# Patient Record
Sex: FEMALE | Race: WHITE | HISPANIC OR LATINO | URBAN - METROPOLITAN AREA
[De-identification: names, ages, dates, MRNs, and addresses within clinical notes are randomized per-mention and may not be internally consistent; named-entity substitution may affect disease eponyms.]

---

## 2017-01-26 ENCOUNTER — EMERGENCY (EMERGENCY)
Facility: HOSPITAL | Age: 32
LOS: 0 days | Discharge: HOME | End: 2017-01-26
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
## 2017-06-27 DIAGNOSIS — S60.416A ABRASION OF RIGHT LITTLE FINGER, INITIAL ENCOUNTER: ICD-10-CM

## 2017-06-27 DIAGNOSIS — S63.616A UNSPECIFIED SPRAIN OF RIGHT LITTLE FINGER, INITIAL ENCOUNTER: ICD-10-CM
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
## 2017-06-27 DIAGNOSIS — M79.644 PAIN IN RIGHT FINGER(S): ICD-10-CM
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
## 2022-11-10 ENCOUNTER — INPATIENT (INPATIENT)
Facility: HOSPITAL | Age: 37
LOS: 2 days | Discharge: HOME | End: 2022-11-13
Attending: OBSTETRICS & GYNECOLOGY | Admitting: OBSTETRICS & GYNECOLOGY

## 2022-11-10 VITALS — HEART RATE: 78 BPM | DIASTOLIC BLOOD PRESSURE: 79 MMHG | SYSTOLIC BLOOD PRESSURE: 128 MMHG

## 2022-11-10 PROCEDURE — 86077 PHYS BLOOD BANK SERV XMATCH: CPT

## 2022-11-10 RX ORDER — CHLORHEXIDINE GLUCONATE 213 G/1000ML
1 SOLUTION TOPICAL ONCE
Refills: 0 | Status: DISCONTINUED | OUTPATIENT
Start: 2022-11-10 | End: 2022-11-11

## 2022-11-10 RX ORDER — OXYTOCIN 10 UNIT/ML
333.33 VIAL (ML) INJECTION
Qty: 20 | Refills: 0 | Status: DISCONTINUED | OUTPATIENT
Start: 2022-11-10 | End: 2022-11-11

## 2022-11-10 RX ORDER — SODIUM CHLORIDE 9 MG/ML
1000 INJECTION, SOLUTION INTRAVENOUS
Refills: 0 | Status: DISCONTINUED | OUTPATIENT
Start: 2022-11-10 | End: 2022-11-11

## 2022-11-10 NOTE — OB RN TRIAGE NOTE - FALL HARM RISK - UNIVERSAL INTERVENTIONS
Bed in lowest position, wheels locked, appropriate side rails in place/Call bell, personal items and telephone in reach/Instruct patient to call for assistance before getting out of bed or chair/Non-slip footwear when patient is out of bed/[place] to call system/Physically safe environment - no spills, clutter or unnecessary equipment/Purposeful Proactive Rounding/Room/bathroom lighting operational, light cord in reach

## 2022-11-10 NOTE — OB RN PATIENT PROFILE - FALL HARM RISK - UNIVERSAL INTERVENTIONS
Bed in lowest position, wheels locked, appropriate side rails in place/Call bell, personal items and telephone in reach/Instruct patient to call for assistance before getting out of bed or chair/Non-slip footwear when patient is out of bed/[place] to call system/Physically safe environment - no spills, clutter or unnecessary equipment/Purposeful Proactive Rounding/Room/bathroom lighting operational, light cord in reach

## 2022-11-11 DIAGNOSIS — Z90.49 ACQUIRED ABSENCE OF OTHER SPECIFIED PARTS OF DIGESTIVE TRACT: Chronic | ICD-10-CM

## 2022-11-11 DIAGNOSIS — Z98.890 OTHER SPECIFIED POSTPROCEDURAL STATES: Chronic | ICD-10-CM

## 2022-11-11 LAB
ALLERGY+IMMUNOLOGY DIAG STUDY NOTE: SIGNIFICANT CHANGE UP
AMPHET UR-MCNC: NEGATIVE — SIGNIFICANT CHANGE UP
APPEARANCE UR: CLEAR — SIGNIFICANT CHANGE UP
BACTERIA # UR AUTO: NEGATIVE — SIGNIFICANT CHANGE UP
BARBITURATES UR SCN-MCNC: NEGATIVE — SIGNIFICANT CHANGE UP
BASOPHILS # BLD AUTO: 0.05 K/UL — SIGNIFICANT CHANGE UP (ref 0–0.2)
BASOPHILS NFR BLD AUTO: 0.4 % — SIGNIFICANT CHANGE UP (ref 0–1)
BENZODIAZ UR-MCNC: NEGATIVE — SIGNIFICANT CHANGE UP
BILIRUB UR-MCNC: NEGATIVE — SIGNIFICANT CHANGE UP
BLD GP AB SCN SERPL QL: SIGNIFICANT CHANGE UP
BUPRENORPHINE SCREEN, URINE RESULT: NEGATIVE — SIGNIFICANT CHANGE UP
COCAINE METAB.OTHER UR-MCNC: NEGATIVE — SIGNIFICANT CHANGE UP
COLOR SPEC: YELLOW — SIGNIFICANT CHANGE UP
DIFF PNL FLD: ABNORMAL
DIR ANTIGLOB POLYSPECIFIC INTERPRETATION: SIGNIFICANT CHANGE UP
EOSINOPHIL # BLD AUTO: 0.13 K/UL — SIGNIFICANT CHANGE UP (ref 0–0.7)
EOSINOPHIL NFR BLD AUTO: 1.1 % — SIGNIFICANT CHANGE UP (ref 0–8)
EPI CELLS # UR: 5 /HPF — SIGNIFICANT CHANGE UP (ref 0–5)
FENTANYL UR QL: NEGATIVE — SIGNIFICANT CHANGE UP
GLUCOSE UR QL: NEGATIVE — SIGNIFICANT CHANGE UP
HCT VFR BLD CALC: 40.5 % — SIGNIFICANT CHANGE UP (ref 37–47)
HGB BLD-MCNC: 13.5 G/DL — SIGNIFICANT CHANGE UP (ref 12–16)
HIV 1 & 2 AB SERPL IA.RAPID: SIGNIFICANT CHANGE UP
HYALINE CASTS # UR AUTO: 2 /LPF — SIGNIFICANT CHANGE UP (ref 0–7)
IMM GRANULOCYTES NFR BLD AUTO: 1.5 % — HIGH (ref 0.1–0.3)
KETONES UR-MCNC: ABNORMAL
L&D DRUG SCREEN, URINE: SIGNIFICANT CHANGE UP
LEUKOCYTE ESTERASE UR-ACNC: NEGATIVE — SIGNIFICANT CHANGE UP
LYMPHOCYTES # BLD AUTO: 1.38 K/UL — SIGNIFICANT CHANGE UP (ref 1.2–3.4)
LYMPHOCYTES # BLD AUTO: 12.2 % — LOW (ref 20.5–51.1)
MCHC RBC-ENTMCNC: 30 PG — SIGNIFICANT CHANGE UP (ref 27–31)
MCHC RBC-ENTMCNC: 33.3 G/DL — SIGNIFICANT CHANGE UP (ref 32–37)
MCV RBC AUTO: 90 FL — SIGNIFICANT CHANGE UP (ref 81–99)
METHADONE UR-MCNC: NEGATIVE — SIGNIFICANT CHANGE UP
MONOCYTES # BLD AUTO: 0.8 K/UL — HIGH (ref 0.1–0.6)
MONOCYTES NFR BLD AUTO: 7.1 % — SIGNIFICANT CHANGE UP (ref 1.7–9.3)
NEUTROPHILS # BLD AUTO: 8.81 K/UL — HIGH (ref 1.4–6.5)
NEUTROPHILS NFR BLD AUTO: 77.7 % — HIGH (ref 42.2–75.2)
NITRITE UR-MCNC: NEGATIVE — SIGNIFICANT CHANGE UP
NRBC # BLD: 0 /100 WBCS — SIGNIFICANT CHANGE UP (ref 0–0)
OPIATES UR-MCNC: NEGATIVE — SIGNIFICANT CHANGE UP
OXYCODONE UR-MCNC: NEGATIVE — SIGNIFICANT CHANGE UP
PCP UR-MCNC: NEGATIVE — SIGNIFICANT CHANGE UP
PH UR: 6.5 — SIGNIFICANT CHANGE UP (ref 5–8)
PLATELET # BLD AUTO: 218 K/UL — SIGNIFICANT CHANGE UP (ref 130–400)
PRENATAL SYPHILIS TEST: SIGNIFICANT CHANGE UP
PROPOXYPHENE QUALITATIVE URINE RESULT: NEGATIVE — SIGNIFICANT CHANGE UP
PROT UR-MCNC: SIGNIFICANT CHANGE UP
RBC # BLD: 4.5 M/UL — SIGNIFICANT CHANGE UP (ref 4.2–5.4)
RBC # FLD: 13 % — SIGNIFICANT CHANGE UP (ref 11.5–14.5)
RBC CASTS # UR COMP ASSIST: 19 /HPF — HIGH (ref 0–4)
SARS-COV-2 RNA SPEC QL NAA+PROBE: SIGNIFICANT CHANGE UP
SP GR SPEC: 1.02 — SIGNIFICANT CHANGE UP (ref 1.01–1.03)
UROBILINOGEN FLD QL: SIGNIFICANT CHANGE UP
WBC # BLD: 11.34 K/UL — HIGH (ref 4.8–10.8)
WBC # FLD AUTO: 11.34 K/UL — HIGH (ref 4.8–10.8)
WBC UR QL: 2 /HPF — SIGNIFICANT CHANGE UP (ref 0–5)

## 2022-11-11 RX ORDER — OXYCODONE HYDROCHLORIDE 5 MG/1
5 TABLET ORAL ONCE
Refills: 0 | Status: DISCONTINUED | OUTPATIENT
Start: 2022-11-11 | End: 2022-11-13

## 2022-11-11 RX ORDER — BENZOCAINE 10 %
1 GEL (GRAM) MUCOUS MEMBRANE EVERY 6 HOURS
Refills: 0 | Status: DISCONTINUED | OUTPATIENT
Start: 2022-11-11 | End: 2022-11-13

## 2022-11-11 RX ORDER — IBUPROFEN 200 MG
600 TABLET ORAL EVERY 6 HOURS
Refills: 0 | Status: COMPLETED | OUTPATIENT
Start: 2022-11-11 | End: 2023-10-10

## 2022-11-11 RX ORDER — SODIUM CHLORIDE 9 MG/ML
3 INJECTION INTRAMUSCULAR; INTRAVENOUS; SUBCUTANEOUS EVERY 8 HOURS
Refills: 0 | Status: DISCONTINUED | OUTPATIENT
Start: 2022-11-11 | End: 2022-11-13

## 2022-11-11 RX ORDER — KETOROLAC TROMETHAMINE 30 MG/ML
30 SYRINGE (ML) INJECTION ONCE
Refills: 0 | Status: DISCONTINUED | OUTPATIENT
Start: 2022-11-11 | End: 2022-11-13

## 2022-11-11 RX ORDER — DEXAMETHASONE 0.5 MG/5ML
4 ELIXIR ORAL EVERY 6 HOURS
Refills: 0 | Status: DISCONTINUED | OUTPATIENT
Start: 2022-11-11 | End: 2022-11-11

## 2022-11-11 RX ORDER — NALOXONE HYDROCHLORIDE 4 MG/.1ML
0.1 SPRAY NASAL
Refills: 0 | Status: DISCONTINUED | OUTPATIENT
Start: 2022-11-11 | End: 2022-11-11

## 2022-11-11 RX ORDER — AER TRAVELER 0.5 G/1
1 SOLUTION RECTAL; TOPICAL EVERY 4 HOURS
Refills: 0 | Status: DISCONTINUED | OUTPATIENT
Start: 2022-11-11 | End: 2022-11-13

## 2022-11-11 RX ORDER — IBUPROFEN 200 MG
600 TABLET ORAL EVERY 6 HOURS
Refills: 0 | Status: DISCONTINUED | OUTPATIENT
Start: 2022-11-11 | End: 2022-11-13

## 2022-11-11 RX ORDER — DIBUCAINE 1 %
1 OINTMENT (GRAM) RECTAL EVERY 6 HOURS
Refills: 0 | Status: DISCONTINUED | OUTPATIENT
Start: 2022-11-11 | End: 2022-11-13

## 2022-11-11 RX ORDER — LANOLIN
1 OINTMENT (GRAM) TOPICAL EVERY 6 HOURS
Refills: 0 | Status: DISCONTINUED | OUTPATIENT
Start: 2022-11-11 | End: 2022-11-13

## 2022-11-11 RX ORDER — TETANUS TOXOID, REDUCED DIPHTHERIA TOXOID AND ACELLULAR PERTUSSIS VACCINE, ADSORBED 5; 2.5; 8; 8; 2.5 [IU]/.5ML; [IU]/.5ML; UG/.5ML; UG/.5ML; UG/.5ML
0.5 SUSPENSION INTRAMUSCULAR ONCE
Refills: 0 | Status: DISCONTINUED | OUTPATIENT
Start: 2022-11-11 | End: 2022-11-13

## 2022-11-11 RX ORDER — ACETAMINOPHEN 500 MG
975 TABLET ORAL
Refills: 0 | Status: DISCONTINUED | OUTPATIENT
Start: 2022-11-11 | End: 2022-11-13

## 2022-11-11 RX ORDER — SIMETHICONE 80 MG/1
80 TABLET, CHEWABLE ORAL EVERY 4 HOURS
Refills: 0 | Status: DISCONTINUED | OUTPATIENT
Start: 2022-11-11 | End: 2022-11-13

## 2022-11-11 RX ORDER — ONDANSETRON 8 MG/1
4 TABLET, FILM COATED ORAL EVERY 6 HOURS
Refills: 0 | Status: DISCONTINUED | OUTPATIENT
Start: 2022-11-11 | End: 2022-11-11

## 2022-11-11 RX ORDER — DIPHENHYDRAMINE HCL 50 MG
25 CAPSULE ORAL EVERY 6 HOURS
Refills: 0 | Status: DISCONTINUED | OUTPATIENT
Start: 2022-11-11 | End: 2022-11-13

## 2022-11-11 RX ORDER — HYDROCORTISONE 1 %
1 OINTMENT (GRAM) TOPICAL EVERY 6 HOURS
Refills: 0 | Status: DISCONTINUED | OUTPATIENT
Start: 2022-11-11 | End: 2022-11-13

## 2022-11-11 RX ORDER — OXYCODONE HYDROCHLORIDE 5 MG/1
5 TABLET ORAL
Refills: 0 | Status: DISCONTINUED | OUTPATIENT
Start: 2022-11-11 | End: 2022-11-13

## 2022-11-11 RX ORDER — OXYTOCIN 10 UNIT/ML
333.33 VIAL (ML) INJECTION
Qty: 20 | Refills: 0 | Status: DISCONTINUED | OUTPATIENT
Start: 2022-11-11 | End: 2022-11-13

## 2022-11-11 RX ORDER — MAGNESIUM HYDROXIDE 400 MG/1
30 TABLET, CHEWABLE ORAL
Refills: 0 | Status: DISCONTINUED | OUTPATIENT
Start: 2022-11-11 | End: 2022-11-13

## 2022-11-11 RX ORDER — FENTANYL/BUPIVACAINE/NS/PF 2MCG/ML-.1
250 PLASTIC BAG, INJECTION (ML) INJECTION
Refills: 0 | Status: DISCONTINUED | OUTPATIENT
Start: 2022-11-11 | End: 2022-11-11

## 2022-11-11 RX ADMIN — Medication 975 MILLIGRAM(S): at 21:16

## 2022-11-11 RX ADMIN — Medication 600 MILLIGRAM(S): at 23:47

## 2022-11-11 RX ADMIN — SODIUM CHLORIDE 3 MILLILITER(S): 9 INJECTION INTRAMUSCULAR; INTRAVENOUS; SUBCUTANEOUS at 21:16

## 2022-11-11 RX ADMIN — Medication 975 MILLIGRAM(S): at 20:17

## 2022-11-11 NOTE — OB PROVIDER DELIVERY SUMMARY - NSSELHIDDEN_OBGYN_ALL_OB_FT
[NS_DeliveryRN_OBGYN_ALL_OB_FT:[email or web address]],[NS_DeliveryAttending1_OBGYN_ALL_OB_FT:[id number]]

## 2022-11-11 NOTE — OB PROVIDER H&P - NSICDXPASTSURGICALHX_GEN_ALL_CORE_FT
PAST SURGICAL HISTORY:  History of dilation and curettage     Status post laparoscopic appendectomy

## 2022-11-11 NOTE — PROCEDURE NOTE - ADDITIONAL PROCEDURE DETAILS
Lumbar epidural performed at L3-4. Standard ASA monitors including FHR. Sterile gloves, chloroprep. 1% lidocaine for local infiltration. 17g touhy. MILENA with saline, epidural catheter threaded easily. Touhy needle removed. Catheter secured in place. Negative aspiration. Test dose consisting of 3ml 1.5% lidocaine with epinephrine was negative. Patient given loading dose of 0.25% Bupivicaine 8 mL given incrementally after negative aspiration. Patient tolerated procedure and was hemodynamically stable throughout. T10 level bilaterally. Epidural infusion consisting of 250ml 0.0625% bupivacaine with fentanyl 2mcg/ml at 10ml/hr.

## 2022-11-11 NOTE — OB PROVIDER H&P - NS_OBGYNHISTORY_OBGYN_ALL_OB_FT
ObHx: : SAB, no D+C  G2: current    GynHx: patient denies history of fibroids, ovarian cysts, abnormal PAPs, STIs

## 2022-11-11 NOTE — OB PROVIDER H&P - ASSESSMENT
A/P: 36yo  at 39w1d GA, RH neg (s/p Rhogam ), GBS neg, in labor    -admit to labor and delivery  -pain management prn   -continous efm & toco  -admission labs  -IV access   -IV hydration   -diet: clear liquid diet     Dr. Veras and Dr. Jennifer bourgeois

## 2022-11-11 NOTE — OB PROVIDER H&P - NSHPPHYSICALEXAM_GEN_ALL_CORE
Vital Signs Last 24 Hrs  T(C): 36.8 (10 Nov 2022 23:09), Max: 36.8 (10 Nov 2022 22:39)  T(F): 98.2 (10 Nov 2022 23:09), Max: 98.2 (10 Nov 2022 22:39)  HR: 70 (10 Nov 2022 23:37) (70 - 78)  BP: 137/75 (10 Nov 2022 23:37) (128/79 - 137/75)  RR: 18 (10 Nov 2022 23:09) (18 - 18)    Physical Exam  Gen: AAOx3, NAD  Abd: soft, gravid, nontender, nondistended, palpable contractions  Ext: no edema or erythema in bilateral upper and lower extremities  SVE: 2/100/-3    EFM: 135 bpm/moderate variability/+accels  [place]: q2min  BSUS: vertex

## 2022-11-11 NOTE — OB PROVIDER H&P - HISTORY OF PRESENT ILLNESS
38yo  at 39w1d GA (ANTHONY 22, by LMP) presents to labor and delivery with contractions. She has been feeling contractions every 2 minutes for the past 2 hours, rates them /10. Endorses vaginal bleeding. It is bright red and she has only kaylee spotting since the contractions started. Denies leakage of fluids. Endorses good fetal movement. GBS neg. Rh neg, s/p rhogam 22. 36yo  at 39w1d GA (ANTHONY 22, by LMP) presents to labor and delivery with contractions. She has been feeling contractions every 2 minutes for the past 2 hours, rates them 7/10. Endorses vaginal bleeding. It is bright red and she has only been spotting since the contractions started. Denies leakage of fluids. Endorses good fetal movement. GBS neg. Rh neg, s/p rhogam 22.

## 2022-11-11 NOTE — OB PROVIDER H&P - NSHPLABSRESULTS_GEN_ALL_CORE
10/27/22  GBS neg    7/25/22  GCT 82    6/23/22  O neg, negative antibody screenVLDR NR  HepBSAg NR  HIV NR  varicella immune  rubella immune    US  35w0d: EFW 3070 (79%ile), ABRAM 21.8cm, vertex, anterior placenta, normal UAD

## 2022-11-11 NOTE — OB PROVIDER DELIVERY SUMMARY - NSPROVIDERDELIVERYNOTE_OBGYN_ALL_OB_FT
Patient was fully dilated and pushing. Fetal head was OA and restituted to ROT. The anterior and posterior shoulders delivered, followed by the remaining body atraumatically. Delayed cord clamping was performed, and then clamped and cut. Cord blood gases collected x2. The  was handed to the mother. The placenta delivered intact with membranes. Pitocin was administered. Uterus massaged, fundus found to be firm. Cervix, vagina and perineum inspected, 2nd degree laceration was noted, repaired using 2-0 chromic in the usual fashion with good hemostasis.     Viable infant delivered      Dr. Del Cid present for the delivery

## 2022-11-12 LAB
ALBUMIN SERPL ELPH-MCNC: 2.8 G/DL — LOW (ref 3.5–5.2)
ALP SERPL-CCNC: 116 U/L — HIGH (ref 30–115)
ALT FLD-CCNC: 22 U/L — SIGNIFICANT CHANGE UP (ref 0–41)
ANION GAP SERPL CALC-SCNC: 7 MMOL/L — SIGNIFICANT CHANGE UP (ref 7–14)
AST SERPL-CCNC: 74 U/L — HIGH (ref 0–41)
BASOPHILS # BLD AUTO: 0.08 K/UL — SIGNIFICANT CHANGE UP (ref 0–0.2)
BASOPHILS NFR BLD AUTO: 0.4 % — SIGNIFICANT CHANGE UP (ref 0–1)
BILIRUB SERPL-MCNC: 0.3 MG/DL — SIGNIFICANT CHANGE UP (ref 0.2–1.2)
BUN SERPL-MCNC: 9 MG/DL — LOW (ref 10–20)
CALCIUM SERPL-MCNC: 8.6 MG/DL — SIGNIFICANT CHANGE UP (ref 8.4–10.4)
CHLORIDE SERPL-SCNC: 100 MMOL/L — SIGNIFICANT CHANGE UP (ref 98–110)
CO2 SERPL-SCNC: 26 MMOL/L — SIGNIFICANT CHANGE UP (ref 17–32)
CREAT SERPL-MCNC: 0.8 MG/DL — SIGNIFICANT CHANGE UP (ref 0.7–1.5)
EGFR: 96 ML/MIN/1.73M2 — SIGNIFICANT CHANGE UP
EOSINOPHIL # BLD AUTO: 0.16 K/UL — SIGNIFICANT CHANGE UP (ref 0–0.7)
EOSINOPHIL NFR BLD AUTO: 0.9 % — SIGNIFICANT CHANGE UP (ref 0–8)
FETAL SCREEN: SIGNIFICANT CHANGE UP
GLUCOSE SERPL-MCNC: 80 MG/DL — SIGNIFICANT CHANGE UP (ref 70–99)
HCT VFR BLD CALC: 31.3 % — LOW (ref 37–47)
HGB BLD-MCNC: 10.6 G/DL — LOW (ref 12–16)
HIV 1 & 2 AB SERPL IA.RAPID: SIGNIFICANT CHANGE UP
IMM GRANULOCYTES NFR BLD AUTO: 1.1 % — HIGH (ref 0.1–0.3)
LDH SERPL L TO P-CCNC: 362 — HIGH (ref 50–242)
LYMPHOCYTES # BLD AUTO: 1.8 K/UL — SIGNIFICANT CHANGE UP (ref 1.2–3.4)
LYMPHOCYTES # BLD AUTO: 9.9 % — LOW (ref 20.5–51.1)
MCHC RBC-ENTMCNC: 30.6 PG — SIGNIFICANT CHANGE UP (ref 27–31)
MCHC RBC-ENTMCNC: 33.9 G/DL — SIGNIFICANT CHANGE UP (ref 32–37)
MCV RBC AUTO: 90.5 FL — SIGNIFICANT CHANGE UP (ref 81–99)
MONOCYTES # BLD AUTO: 1.16 K/UL — HIGH (ref 0.1–0.6)
MONOCYTES NFR BLD AUTO: 6.4 % — SIGNIFICANT CHANGE UP (ref 1.7–9.3)
NEUTROPHILS # BLD AUTO: 14.72 K/UL — HIGH (ref 1.4–6.5)
NEUTROPHILS NFR BLD AUTO: 81.3 % — HIGH (ref 42.2–75.2)
NRBC # BLD: 0 /100 WBCS — SIGNIFICANT CHANGE UP (ref 0–0)
PLATELET # BLD AUTO: 192 K/UL — SIGNIFICANT CHANGE UP (ref 130–400)
POTASSIUM SERPL-MCNC: 5 MMOL/L — SIGNIFICANT CHANGE UP (ref 3.5–5)
POTASSIUM SERPL-SCNC: 5 MMOL/L — SIGNIFICANT CHANGE UP (ref 3.5–5)
PROT SERPL-MCNC: 4.9 G/DL — LOW (ref 6–8)
RBC # BLD: 3.46 M/UL — LOW (ref 4.2–5.4)
RBC # FLD: 13.1 % — SIGNIFICANT CHANGE UP (ref 11.5–14.5)
SODIUM SERPL-SCNC: 133 MMOL/L — LOW (ref 135–146)
URATE SERPL-MCNC: 4.9 MG/DL — SIGNIFICANT CHANGE UP (ref 2.5–7)
WBC # BLD: 18.12 K/UL — HIGH (ref 4.8–10.8)
WBC # FLD AUTO: 18.12 K/UL — HIGH (ref 4.8–10.8)

## 2022-11-12 RX ORDER — IBUPROFEN 200 MG
1 TABLET ORAL
Qty: 0 | Refills: 0 | DISCHARGE
Start: 2022-11-12

## 2022-11-12 RX ORDER — ACETAMINOPHEN 500 MG
3 TABLET ORAL
Qty: 0 | Refills: 0 | DISCHARGE
Start: 2022-11-12

## 2022-11-12 RX ADMIN — Medication 600 MILLIGRAM(S): at 00:21

## 2022-11-12 RX ADMIN — Medication 600 MILLIGRAM(S): at 12:07

## 2022-11-12 RX ADMIN — Medication 600 MILLIGRAM(S): at 06:00

## 2022-11-12 RX ADMIN — Medication 1 TABLET(S): at 12:07

## 2022-11-12 RX ADMIN — SODIUM CHLORIDE 3 MILLILITER(S): 9 INJECTION INTRAMUSCULAR; INTRAVENOUS; SUBCUTANEOUS at 22:08

## 2022-11-12 RX ADMIN — SODIUM CHLORIDE 3 MILLILITER(S): 9 INJECTION INTRAMUSCULAR; INTRAVENOUS; SUBCUTANEOUS at 15:12

## 2022-11-12 RX ADMIN — Medication 600 MILLIGRAM(S): at 05:24

## 2022-11-12 RX ADMIN — SODIUM CHLORIDE 3 MILLILITER(S): 9 INJECTION INTRAMUSCULAR; INTRAVENOUS; SUBCUTANEOUS at 05:46

## 2022-11-12 NOTE — DISCHARGE NOTE OB - PATIENT PORTAL LINK FT
You can access the FollowMyHealth Patient Portal offered by [facility] by registering at the following website: [email or web address]. By joining [employer]’s FollowMyHealth portal, you will also be able to view your health information using other applications (apps) compatible with our system.

## 2022-11-12 NOTE — DISCHARGE NOTE OB - NS MD DC FALL RISK RISK
For information on Fall & Injury Prevention, visit: [email or web address] OR  [email or web address] OR  https://www.cdc.gov/steadi/patient.html

## 2022-11-12 NOTE — DISCHARGE NOTE OB - CARE PROVIDER_API CALL
Zane Rodriguez (MD)  Obstetrics and Gynecology  [address] [place] [address]  Phone: [phone]  Fax: [phone]  Follow Up Time:

## 2022-11-12 NOTE — PROGRESS NOTE ADULT - ASSESSMENT
A/P: 38yo now P1 s/p  PPD#1, gHTN r/o preeclampsia, rising AST, recovering well    -pain management prn  -cont efm/toco  -f/u pending labs  430am  -cont to monitor vitals  -cont iv hydration    Attending Dr. Ortiz at bedside and Dr. Gale aware

## 2022-11-12 NOTE — DISCHARGE NOTE OB - MEDICATION SUMMARY - MEDICATIONS TO TAKE
I will START or STAY ON the medications listed below when I get home from the hospital:    ibuprofen 600 mg oral tablet  -- 1 tab(s) by mouth every 6 hours, As Needed  -- Indication: For pain    acetaminophen 325 mg oral tablet  -- 3 tab(s) by mouth every 8 hours, As Needed  -- Indication: For pain

## 2022-11-13 VITALS
TEMPERATURE: 97 F | SYSTOLIC BLOOD PRESSURE: 98 MMHG | DIASTOLIC BLOOD PRESSURE: 56 MMHG | RESPIRATION RATE: 18 BRPM | HEART RATE: 80 BPM

## 2022-11-13 LAB
ALBUMIN SERPL ELPH-MCNC: 3.1 G/DL — LOW (ref 3.5–5.2)
ALP SERPL-CCNC: 118 U/L — HIGH (ref 30–115)
ALT FLD-CCNC: 30 U/L — SIGNIFICANT CHANGE UP (ref 0–41)
ANION GAP SERPL CALC-SCNC: 10 MMOL/L — SIGNIFICANT CHANGE UP (ref 7–14)
AST SERPL-CCNC: 83 U/L — HIGH (ref 0–41)
BASOPHILS # BLD AUTO: 0.09 K/UL — SIGNIFICANT CHANGE UP (ref 0–0.2)
BASOPHILS NFR BLD AUTO: 0.6 % — SIGNIFICANT CHANGE UP (ref 0–1)
BILIRUB SERPL-MCNC: <0.2 MG/DL — SIGNIFICANT CHANGE UP (ref 0.2–1.2)
BUN SERPL-MCNC: 8 MG/DL — LOW (ref 10–20)
CALCIUM SERPL-MCNC: 8.7 MG/DL — SIGNIFICANT CHANGE UP (ref 8.4–10.5)
CHLORIDE SERPL-SCNC: 104 MMOL/L — SIGNIFICANT CHANGE UP (ref 98–110)
CO2 SERPL-SCNC: 26 MMOL/L — SIGNIFICANT CHANGE UP (ref 17–32)
CREAT SERPL-MCNC: 0.7 MG/DL — SIGNIFICANT CHANGE UP (ref 0.7–1.5)
EGFR: 113 ML/MIN/1.73M2 — SIGNIFICANT CHANGE UP
EOSINOPHIL # BLD AUTO: 0.26 K/UL — SIGNIFICANT CHANGE UP (ref 0–0.7)
EOSINOPHIL NFR BLD AUTO: 1.9 % — SIGNIFICANT CHANGE UP (ref 0–8)
GLUCOSE SERPL-MCNC: 66 MG/DL — LOW (ref 70–99)
HCT VFR BLD CALC: 30.5 % — LOW (ref 37–47)
HGB BLD-MCNC: 10.1 G/DL — LOW (ref 12–16)
IMM GRANULOCYTES NFR BLD AUTO: 1.7 % — HIGH (ref 0.1–0.3)
LDH SERPL L TO P-CCNC: 364 — HIGH (ref 50–242)
LYMPHOCYTES # BLD AUTO: 1.79 K/UL — SIGNIFICANT CHANGE UP (ref 1.2–3.4)
LYMPHOCYTES # BLD AUTO: 12.8 % — LOW (ref 20.5–51.1)
MCHC RBC-ENTMCNC: 30 PG — SIGNIFICANT CHANGE UP (ref 27–31)
MCHC RBC-ENTMCNC: 33.1 G/DL — SIGNIFICANT CHANGE UP (ref 32–37)
MCV RBC AUTO: 90.5 FL — SIGNIFICANT CHANGE UP (ref 81–99)
MONOCYTES # BLD AUTO: 0.84 K/UL — HIGH (ref 0.1–0.6)
MONOCYTES NFR BLD AUTO: 6 % — SIGNIFICANT CHANGE UP (ref 1.7–9.3)
NEUTROPHILS # BLD AUTO: 10.73 K/UL — HIGH (ref 1.4–6.5)
NEUTROPHILS NFR BLD AUTO: 77 % — HIGH (ref 42.2–75.2)
NRBC # BLD: 0 /100 WBCS — SIGNIFICANT CHANGE UP (ref 0–0)
PLATELET # BLD AUTO: 199 K/UL — SIGNIFICANT CHANGE UP (ref 130–400)
POTASSIUM SERPL-MCNC: 4.4 MMOL/L — SIGNIFICANT CHANGE UP (ref 3.5–5)
POTASSIUM SERPL-SCNC: 4.4 MMOL/L — SIGNIFICANT CHANGE UP (ref 3.5–5)
PROT SERPL-MCNC: 5.3 G/DL — LOW (ref 6–8)
RBC # BLD: 3.37 M/UL — LOW (ref 4.2–5.4)
RBC # FLD: 13.3 % — SIGNIFICANT CHANGE UP (ref 11.5–14.5)
SODIUM SERPL-SCNC: 140 MMOL/L — SIGNIFICANT CHANGE UP (ref 135–146)
URATE SERPL-MCNC: 4.1 MG/DL — SIGNIFICANT CHANGE UP (ref 2.5–7)
WBC # BLD: 13.95 K/UL — HIGH (ref 4.8–10.8)
WBC # FLD AUTO: 13.95 K/UL — HIGH (ref 4.8–10.8)

## 2022-11-13 PROCEDURE — 93970 EXTREMITY STUDY: CPT | Mod: 26

## 2022-11-13 RX ORDER — IBUPROFEN 200 MG
1 TABLET ORAL
Qty: 20 | Refills: 0
Start: 2022-11-13 | End: 2022-11-17

## 2022-11-13 RX ADMIN — Medication 600 MILLIGRAM(S): at 07:06

## 2022-11-13 RX ADMIN — Medication 600 MILLIGRAM(S): at 18:43

## 2022-11-13 RX ADMIN — Medication 600 MILLIGRAM(S): at 00:19

## 2022-11-13 RX ADMIN — Medication 1 TABLET(S): at 13:39

## 2022-11-13 RX ADMIN — Medication 600 MILLIGRAM(S): at 01:00

## 2022-11-13 RX ADMIN — Medication 600 MILLIGRAM(S): at 13:39

## 2022-11-13 RX ADMIN — SODIUM CHLORIDE 3 MILLILITER(S): 9 INJECTION INTRAMUSCULAR; INTRAVENOUS; SUBCUTANEOUS at 07:04

## 2022-11-13 RX ADMIN — Medication 975 MILLIGRAM(S): at 13:09

## 2022-11-13 NOTE — OB RN DELIVERY SUMMARY - NSMECDELIVBABYA_OBGYN_ALL_OB
Writer, RT, and CT tech arrived to CT with pt at this time 1649 ;  pt on cardiac monitoring, vital signs cycling.     1649     yes

## 2022-11-13 NOTE — PROGRESS NOTE ADULT - ASSESSMENT
A/P: 36yo now P1 s/p  PPD#2, gHTN r/o preeclampsia, rising AST, recovering well    -pain management prn  -cont efm/toco  - f/u LE duplex   -cont to monitor vitals  -cont iv hydration  - anticipate d/c today    Dr. Ortiz and Dr. Neves aware.

## 2022-11-13 NOTE — PROGRESS NOTE ADULT - SUBJECTIVE AND OBJECTIVE BOX
PGY1 Note    Patient seen and examined. Pain well controlled at this time. No complaints at this time. Denies fever, chills, nausea, vomiting, chest pain, shortness of breath, severe abdominal pain, heavy vaginal bleeding, headache, scotomata, and RUQ pain    Patient is ambulating.   Passing flatus.   Tolerating regular diet   Voiding without difficulty, no dysuria     MEDICATIONS  (STANDING):  acetaminophen     Tablet .. 975 milliGRAM(s) Oral <User Schedule>  diphtheria/tetanus/pertussis (acellular) Vaccine (Adacel) 0.5 milliLiter(s) IntraMuscular once  ibuprofen  Tablet. 600 milliGRAM(s) Oral every 6 hours  ketorolac   Injectable 30 milliGRAM(s) IV Push once  oxytocin Infusion 333.333 milliUNIT(s)/Min (1000 mL/Hr) IV Continuous <Continuous>  prenatal multivitamin 1 Tablet(s) Oral daily  sodium chloride 0.9% lock flush 3 milliLiter(s) IV Push every 8 hours    Physical Exam  Vital Signs Last 24 Hrs  T(C): 36.1 (12 Nov 2022 08:18), Max: 36.7 (11 Nov 2022 21:34)  T(F): 96.9 (12 Nov 2022 08:18), Max: 98.1 (11 Nov 2022 21:34)  HR: 81 (12 Nov 2022 08:18) (73 - 97)  BP: 137/60 (12 Nov 2022 08:18) (109/56 - 139/63)  RR: 18 (12 Nov 2022 08:18) (16 - 18)    Gen: AAOx3, NAD  Cardio: RRR, S1S2, no M/R/G  Resp: CTAB  Ext: No calf tenderness, no swelling  Fundus: firm, below umbilicus. Nontender.   Abd: Soft, nontender, nondistended, BS+  Lochia: minimal moderate      Labs:                        10.6   18.12 )-----------( 192      ( 12 Nov 2022 07:38 )             31.3                         13.5   11.34 )-----------( 218      ( 10 Nov 2022 23:50 )             40.5      
PGY1 Note: Vaginal Delivery    PPD#2. Pt seen and evaluated at bedside. Pain well controlled. Vaginal bleeding minimal. c/o L thigh swelling and "fullness;" denies pain, tenderness. Denies dizziness/lightheadedness/CP/SOB/palpitations. Denies RUQ/epigastric pain. Denies fever, chills, nausea/vomiting, diarrhea, dysuria, LE pain.     Ambulating: Yes  Voiding: Yes  Breast or bottle feeding: breast  Diet: tolerating regular     Physical Exam  Vital Signs Last 24 Hrs  T(C): 35.8 (13 Nov 2022 11:15), Max: 36.7 (13 Nov 2022 05:30)  T(F): 96.4 (13 Nov 2022 11:15), Max: 98 (13 Nov 2022 05:30)  HR: 75 (13 Nov 2022 11:15) (67 - 82)  BP: 116/63 (13 Nov 2022 11:15) (109/64 - 131/73)  RR: 18 (13 Nov 2022 11:15) (18 - 18)      Gen: AAOx3, NAD  Lungs: CTABL  CVS: S1, S2, RRR   Fundus: firm, below umbilicus   Abd: Soft, nontender, nondistended  Lochia: minimal   Ext: No calf tenderness, L > R visible asymmetry no pitting edema   Neuro: 2+ brachial reflexes bilaterally    Labs:                        10.1   13.95 )-----------( 199      ( 13 Nov 2022 07:25 )             30.5                         10.6   18.12 )-----------( 192      ( 12 Nov 2022 07:38 )             31.3      11-13    140  |  104  |  8<L>  ----------------------------<  66<L>  4.4   |  26  |  0.7    Ca    8.7      13 Nov 2022 07:25    TPro  5.3<L>  /  Alb  3.1<L>  /  TBili  <0.2  /  DBili  x   /  AST  83<H>  /  ALT  30  /  AlkPhos  118<H>  11-13    MEDICATIONS  (STANDING):  acetaminophen     Tablet .. 975 milliGRAM(s) Oral <User Schedule>  diphtheria/tetanus/pertussis (acellular) Vaccine (Adacel) 0.5 milliLiter(s) IntraMuscular once  ibuprofen  Tablet. 600 milliGRAM(s) Oral every 6 hours  ketorolac   Injectable 30 milliGRAM(s) IV Push once  oxytocin Infusion 333.333 milliUNIT(s)/Min (1000 mL/Hr) IV Continuous <Continuous>  prenatal multivitamin 1 Tablet(s) Oral daily  sodium chloride 0.9% lock flush 3 milliLiter(s) IV Push every 8 hours    MEDICATIONS  (PRN):  benzocaine 20%/menthol 0.5% Spray 1 Spray(s) Topical every 6 hours PRN for Perineal discomfort  dibucaine 1% Ointment 1 Application(s) Topical every 6 hours PRN Perineal discomfort  diphenhydrAMINE 25 milliGRAM(s) Oral every 6 hours PRN Pruritus  hydrocortisone 1% Cream 1 Application(s) Topical every 6 hours PRN Moderate Pain (4-6)  lanolin Ointment 1 Application(s) Topical every 6 hours PRN nipple soreness  magnesium hydroxide Suspension 30 milliLiter(s) Oral two times a day PRN Constipation  oxyCODONE    IR 5 milliGRAM(s) Oral every 3 hours PRN Moderate to Severe Pain (4-10)  oxyCODONE    IR 5 milliGRAM(s) Oral once PRN Moderate to Severe Pain (4-10)  simethicone 80 milliGRAM(s) Chew every 4 hours PRN Gas  witch hazel Pads 1 Application(s) Topical every 4 hours PRN Perineal discomfort    
Patient seen at bedside, complaining of pain.    T(C): 36.8 (22 @ 02:00), Max: 36.8 (11-10-22 @ 22:39)  HR: 73 (22 @ 11:32) (66 - 111)  BP: 155/74 (22 @ 11:25) (110/64 - 176/97)  RR: 18 (22 @ 02:00) (18 - 18)  SpO2: 99% (22 @ 11:32) (75% - 100%)    EFM: 140 bpm/mod/+accels  TOCO: q 1-2 mins  SVE: 10/100/+1    Meds:chlorhexidine 2% Cloths 1 Application(s) Topical once  dexAMETHasone  Injectable 4 milliGRAM(s) IV Push every 6 hours PRN  fentanyl (2 MICROgram(s)/mL) + bupivacaine 0.0625%  in 0.9% Sodium Chloride PCEA 250 milliLiter(s) Epidural PCA Continuous  lactated ringers. 1000 milliLiter(s) IV Continuous <Continuous>  naloxone Injectable 0.1 milliGRAM(s) IV Push every 3 minutes PRN  ondansetron Injectable 4 milliGRAM(s) IV Push every 6 hours PRN  oxytocin Infusion 333.333 milliUNIT(s)/Min IV Continuous <Continuous>      Labs:      A/P: +  36yo  at 39w2d GA, RH neg (s/p Rhogam /), GBS neg, epidural in place, in labor.    Plan:  Continue EFM/TOCO  Continue IV hydration  Continue Clear Liquid diet  Epidural in Place  Pain management PRN    Dr. Alethea bourgeois
PGY4 Note    Patient seen at bedside for evaluation of labor progression, doing well, no complaints. Epidural in place, pain well-controlled.     Vital Signs Last 24 Hrs  T(C): 36.8 (2022 02:00), Max: 36.8 (10 Nov 2022 22:39)  T(F): 98.2 (10 Nov 2022 23:09), Max: 98.2 (10 Nov 2022 22:39)  HR: 70 (2022 03:07) (66 - 103)  BP: 125/70 (2022 02:54) (121/63 - 147/82)  RR: 18 (2022 02:00) (18 - 18)  SpO2: 92% (2022 03:11) (75% - 100%)    Parameters below as of 10 Nov 2022 23:09  Patient On (Oxygen Delivery Method): room air      EFM: 120/mod alis/+accels  TOCO: q2 mins  SVE:6/100/-1, vertex, bulging    Medications:  Epidural started @0105      Labs:                        13.5   11.34 )-----------( 218      ( 10 Nov 2022 23:50 )             40.5           ABO RH Interpretation: O NEG (11-10-22 @ 23:50)    Antibody Screen: POS (11-10-22 @ 23:50)        Prenatal Syphilis Test: Nonreact (11-10-22 @ 23:50)              A/P:   A/P: 38yo  at 39w2d GA, RH neg (s/p Rhogam ), GBS neg, epidural in place, now in active labor    -pain management prn  -cont efm/toco  -f/u pending labs - UDS  -cont to monitor vitals  -cont iv hydration    Dr. Rodriguez aware

## 2022-11-19 DIAGNOSIS — Z28.09 IMMUNIZATION NOT CARRIED OUT BECAUSE OF OTHER CONTRAINDICATION: ICD-10-CM

## 2022-11-19 DIAGNOSIS — Z3A.39 39 WEEKS GESTATION OF PREGNANCY: ICD-10-CM

## 2023-02-15 NOTE — DISCHARGE NOTE OB - BREASTFEEDING PROVIDES STABLE TEMPERATURE THROUGH SKIN TO SKIN CONTACT
[facility] Hospitalist Progress Note    Name: Maria C Barreto  Age: 37 year old  Sex: female  MRN: [id number]    Chief Complaint: Abd pain    Subjective     Interval Events:  Failed diet last night, with nausea, vomiting    Subjective:   Still nauseous, in quite a bit of pain. Abdomen a bit more distended. The IR drain has stopped putting out fluid after initial ~50cc. Passing some flatus.    Full 10 point ROS is negative unless stated above    Prior to Admission medications    Medication Sig Start Date End Date Taking? Authorizing Provider   gabapentin (NEURONTIN) 800 MG tablet Take 1 tablet by mouth in the morning and 1 tablet at noon and 1 tablet in the evening. 11/28/22 2/26/23 Yes DENISE Begum      Current Facility-Administered Medications   Medication Dose Route Frequency Provider Last Rate Last Admin   • iohexol ORAL (OMNIPAQUE 350) oral contrast solution 12.5 mL  12.5 mL Oral Once Sin Little DO       • iohexol ORAL (OMNIPAQUE 350) oral contrast solution 12.5 mL  12.5 mL Oral Once Sin Little DO       • HYDROcodone-acetaminophen (NORCO) 5-325 MG per tablet 1 tablet  1 tablet Oral Q4H PRN ALBARO Dexter       • HYDROmorphone (DILAUDID) injection 0.3 mg  0.3 mg Intravenous Q4H PRN ALBARO Dexter       • nalbuphine (NUBAIN) injection 2.5 mg  2.5 mg Intravenous Q8H PRN ALBARO Worthington       • naLOXone (NARCAN) injection 0.1 mg  0.1 mg Intravenous PRN Mary Benavidez CNS       • albuterol inhaler 2 puff  2 puff Inhalation Q4H Resp PRN Dedrick Woo MD       • acetaminophen (TYLENOL) tablet 650 mg  650 mg Oral Q4H PRN Keyanna BAZAN Maite, DO       • melatonin tablet 3 mg  3 mg Oral QHS PRN Keyanna BAZAN Maite, DO   3 mg at 02/15/23 0024   • lactated ringers infusion   Intravenous Continuous Sin Little, DO 50 mL/hr at 02/15/23 1424 New Bag at 02/15/23 1424   • ondansetron (ZOFRAN) injection 4 mg  4 mg Intravenous Q6H PRN Keyanna R Maite, DO   4 mg at 02/14/23 2045   • gabapentin (NEURONTIN)  capsule 800 mg  800 mg Oral TID Keyanna Arora DO   800 mg at 02/15/23 1415        Objective       Vital Last Value 24 Hour Range   Temperature 98.4 °F (36.9 °C) (02/15/23 1502) Temp  Min: 97.7 °F (36.5 °C)  Max: 98.8 °F (37.1 °C)   Pulse 62 (02/15/23 1502) Pulse  Min: 50  Max: 73   Respiratory 16 (02/15/23 1502) Resp  Min: 7  Max: 16   Non-Invasive  Blood Pressure 130/72 (02/15/23 1502) BP  Min: 129/78  Max: 160/101   Pulse Oximetry 97 % (02/15/23 1502) SpO2  Min: 84 %  Max: 100 %   Arterial   Blood Pressure   No data recorded        Intake/Output Summary (Last 24 hours) at 2/15/2023 1629  Last data filed at 2/15/2023 1200  Gross per 24 hour   Intake 240 ml   Output 1173.5 ml   Net -933.5 ml          Physical Exam  Constitutional:       Appearance: She is well-developed.   HENT:      Head: Normocephalic and atraumatic.      Neck: Normal range of motion and neck supple.   Cardiovascular:      Rate and Rhythm: Normal rate and regular rhythm.      Heart sounds: Normal heart sounds. No murmur heard.    No friction rub. No gallop.   Pulmonary:      Effort: Pulmonary effort is normal. No respiratory distress.      Breath sounds: Normal breath sounds. No stridor. No wheezing or rales.   Abdominal:      General: Bowel sounds are normal. There is no distension.      Palpations: Abdomen is soft. There is no mass.      Tenderness: There is abdominal tenderness. There is no guarding or rebound.      Comments: FILIPE drain without any drainage   Skin:     General: Skin is warm and dry.   Neurological:      Mental Status: She is alert and oriented to person, place, and time.      Cranial Nerves: No cranial nerve deficit.          Labs:  Recent Labs   Lab 02/13/23  0211 02/11/23  1822   WBC 5.8 7.7   RBC 3.29* 3.65*   HGB 10.6* 11.8*   HCT 32.7* 34.7*   MCV 99.4 95.1   MCH 32.2 32.3   MCHC 32.4 34.0   RDW-CV 13.0 12.9    263   Lymphocytes, Percent 33 12       Recent Labs   Lab 02/13/23  0211 02/12/23  1723 02/12/23  1211  02/11/23  1822   Sodium 139  --   --  139   Chloride 111*  --   --  106   BUN 13  --   --  10   BUN/ Creatinine Ratio 22  --   --  17   Potassium 3.3* 2.7* 2.6* 3.3*   Glucose 82  --   --  101*   Creatinine 0.58  --   --  0.58   Calcium 8.4  --   --  8.8       Imaging/Micro/Path:    Microbiology Results  (Last 10 results in the past 7 days)    Specimen   Gram Smear   Culture Result   Status       02/14/23  1636         No polymorphonuclear cells seen.  [P]            No epithelial cells seen.  [P]            No organisms seen.  [P]                 [P] - Preliminary Result             * Cannot find OR log *     Assessment and Plan     Maria C is a 37 year old female with a prior history of UC s/p remote subtotal colectomy as teenager with eventual pouch reversal, history of hysterectomy, and recent history of multiple SBOs, who presents with complex right adnexal cyst.    #Right adnexal complex cyst, s/p IR drainage on 2/14/23  - Gram stains without any organisms, tumor markers negative  - Continue FILIPE drain --> IR to try and reposition given lack of output and persistent pain  - Repeat CT scan today given worsening pain  - Gyn Onc following    #Large bowel obstruction with history of UC and subtotal colectomy  - Likely related to lower abdominal adhesions with prior surgical history; previously has hx of SBOs  - Hx of subtotal colectomy, not currently on any UC treatments  - Last Flex Sig in 2019 at [facility], anastomotic sites unremarkable at that time  - Repeat CT scan today  - Consider colorectal surgery consultation if no improvement on CT scan  - If any further nausea, recommend NPO    #Uncontrolled pain  - Pain services is following, weaning off Dilaudid PCA given above issues  - Oral pain regimen    #Electrolyte abnormalities  - Modify fluids to D5LR + KPhos    #Dysuria  - Checking UA    DVT PPx: will give 1 dose of enoxaparin or heparin tonight if CT scan okay    Discharge Planning:  - Code  Status:   Code Status: Full Resuscitation  - PCP: No Pcp    DO REGI [facility]   Department of Internal Medicine    2/15/2023 4:29 PM       Statement Selected

## 2024-02-12 ENCOUNTER — INPATIENT (INPATIENT)
Facility: HOSPITAL | Age: 39
LOS: 1 days | Discharge: HOME | End: 2024-02-14
Attending: STUDENT IN AN ORGANIZED HEALTH CARE EDUCATION/TRAINING PROGRAM | Admitting: STUDENT IN AN ORGANIZED HEALTH CARE EDUCATION/TRAINING PROGRAM
Payer: COMMERCIAL

## 2024-02-12 VITALS — DIASTOLIC BLOOD PRESSURE: 61 MMHG | SYSTOLIC BLOOD PRESSURE: 127 MMHG | HEART RATE: 80 BPM

## 2024-02-12 DIAGNOSIS — Z98.890 OTHER SPECIFIED POSTPROCEDURAL STATES: Chronic | ICD-10-CM

## 2024-02-12 DIAGNOSIS — O61.0 FAILED MEDICAL INDUCTION OF LABOR: ICD-10-CM

## 2024-02-12 DIAGNOSIS — Z90.49 ACQUIRED ABSENCE OF OTHER SPECIFIED PARTS OF DIGESTIVE TRACT: Chronic | ICD-10-CM

## 2024-02-12 PROBLEM — Z78.9 OTHER SPECIFIED HEALTH STATUS: Chronic | Status: ACTIVE | Noted: 2022-11-11

## 2024-02-12 LAB
APPEARANCE UR: ABNORMAL
BASOPHILS # BLD AUTO: 0.03 K/UL — SIGNIFICANT CHANGE UP (ref 0–0.2)
BASOPHILS NFR BLD AUTO: 0.4 % — SIGNIFICANT CHANGE UP (ref 0–1)
BILIRUB UR-MCNC: NEGATIVE — SIGNIFICANT CHANGE UP
COLOR SPEC: YELLOW — SIGNIFICANT CHANGE UP
DIFF PNL FLD: NEGATIVE — SIGNIFICANT CHANGE UP
EOSINOPHIL # BLD AUTO: 0.05 K/UL — SIGNIFICANT CHANGE UP (ref 0–0.7)
EOSINOPHIL NFR BLD AUTO: 0.6 % — SIGNIFICANT CHANGE UP (ref 0–8)
GLUCOSE UR QL: NEGATIVE MG/DL — SIGNIFICANT CHANGE UP
HCT VFR BLD CALC: 32.9 % — LOW (ref 37–47)
HGB BLD-MCNC: 10.8 G/DL — LOW (ref 12–16)
IMM GRANULOCYTES NFR BLD AUTO: 1.5 % — HIGH (ref 0.1–0.3)
KETONES UR-MCNC: NEGATIVE MG/DL — SIGNIFICANT CHANGE UP
L&D DRUG SCREEN, URINE: SIGNIFICANT CHANGE UP
LEUKOCYTE ESTERASE UR-ACNC: ABNORMAL
LYMPHOCYTES # BLD AUTO: 1.18 K/UL — LOW (ref 1.2–3.4)
LYMPHOCYTES # BLD AUTO: 15.1 % — LOW (ref 20.5–51.1)
MCHC RBC-ENTMCNC: 28.1 PG — SIGNIFICANT CHANGE UP (ref 27–31)
MCHC RBC-ENTMCNC: 32.8 G/DL — SIGNIFICANT CHANGE UP (ref 32–37)
MCV RBC AUTO: 85.7 FL — SIGNIFICANT CHANGE UP (ref 81–99)
MONOCYTES # BLD AUTO: 0.59 K/UL — SIGNIFICANT CHANGE UP (ref 0.1–0.6)
MONOCYTES NFR BLD AUTO: 7.5 % — SIGNIFICANT CHANGE UP (ref 1.7–9.3)
NEUTROPHILS # BLD AUTO: 5.87 K/UL — SIGNIFICANT CHANGE UP (ref 1.4–6.5)
NEUTROPHILS NFR BLD AUTO: 74.9 % — SIGNIFICANT CHANGE UP (ref 42.2–75.2)
NITRITE UR-MCNC: NEGATIVE — SIGNIFICANT CHANGE UP
NRBC # BLD: 0 /100 WBCS — SIGNIFICANT CHANGE UP (ref 0–0)
PH UR: 7.5 — SIGNIFICANT CHANGE UP (ref 5–8)
PLATELET # BLD AUTO: 215 K/UL — SIGNIFICANT CHANGE UP (ref 130–400)
PMV BLD: 11.2 FL — HIGH (ref 7.4–10.4)
PRENATAL SYPHILIS TEST: SIGNIFICANT CHANGE UP
PROT UR-MCNC: NEGATIVE MG/DL — SIGNIFICANT CHANGE UP
RBC # BLD: 3.84 M/UL — LOW (ref 4.2–5.4)
RBC # FLD: 13.6 % — SIGNIFICANT CHANGE UP (ref 11.5–14.5)
SP GR SPEC: 1.01 — SIGNIFICANT CHANGE UP (ref 1–1.03)
UROBILINOGEN FLD QL: 0.2 MG/DL — SIGNIFICANT CHANGE UP (ref 0.2–1)
WBC # BLD: 7.84 K/UL — SIGNIFICANT CHANGE UP (ref 4.8–10.8)
WBC # FLD AUTO: 7.84 K/UL — SIGNIFICANT CHANGE UP (ref 4.8–10.8)

## 2024-02-12 PROCEDURE — 85025 COMPLETE CBC W/AUTO DIFF WBC: CPT

## 2024-02-12 PROCEDURE — 86592 SYPHILIS TEST NON-TREP QUAL: CPT

## 2024-02-12 PROCEDURE — 86901 BLOOD TYPING SEROLOGIC RH(D): CPT

## 2024-02-12 PROCEDURE — 86850 RBC ANTIBODY SCREEN: CPT

## 2024-02-12 PROCEDURE — 80354 DRUG SCREENING FENTANYL: CPT

## 2024-02-12 PROCEDURE — 59050 FETAL MONITOR W/REPORT: CPT

## 2024-02-12 PROCEDURE — 86922 COMPATIBILITY TEST ANTIGLOB: CPT

## 2024-02-12 PROCEDURE — 36415 COLL VENOUS BLD VENIPUNCTURE: CPT

## 2024-02-12 PROCEDURE — 80307 DRUG TEST PRSMV CHEM ANLYZR: CPT

## 2024-02-12 PROCEDURE — 86870 RBC ANTIBODY IDENTIFICATION: CPT

## 2024-02-12 PROCEDURE — 81001 URINALYSIS AUTO W/SCOPE: CPT

## 2024-02-12 PROCEDURE — 86880 COOMBS TEST DIRECT: CPT

## 2024-02-12 PROCEDURE — 86900 BLOOD TYPING SEROLOGIC ABO: CPT

## 2024-02-12 PROCEDURE — 86703 HIV-1/HIV-2 1 RESULT ANTBDY: CPT

## 2024-02-12 RX ORDER — SIMETHICONE 80 MG/1
80 TABLET, CHEWABLE ORAL EVERY 4 HOURS
Refills: 0 | Status: DISCONTINUED | OUTPATIENT
Start: 2024-02-12 | End: 2024-02-14

## 2024-02-12 RX ORDER — HYDROCORTISONE 1 %
1 OINTMENT (GRAM) TOPICAL EVERY 6 HOURS
Refills: 0 | Status: DISCONTINUED | OUTPATIENT
Start: 2024-02-12 | End: 2024-02-14

## 2024-02-12 RX ORDER — OXYTOCIN 10 UNIT/ML
VIAL (ML) INJECTION
Qty: 30 | Refills: 0 | Status: DISCONTINUED | OUTPATIENT
Start: 2024-02-12 | End: 2024-02-12

## 2024-02-12 RX ORDER — PRAMOXINE HYDROCHLORIDE 150 MG/15G
1 AEROSOL, FOAM RECTAL EVERY 4 HOURS
Refills: 0 | Status: DISCONTINUED | OUTPATIENT
Start: 2024-02-12 | End: 2024-02-14

## 2024-02-12 RX ORDER — SODIUM CHLORIDE 9 MG/ML
1000 INJECTION, SOLUTION INTRAVENOUS
Refills: 0 | Status: DISCONTINUED | OUTPATIENT
Start: 2024-02-12 | End: 2024-02-12

## 2024-02-12 RX ORDER — DIBUCAINE 1 %
1 OINTMENT (GRAM) RECTAL EVERY 6 HOURS
Refills: 0 | Status: DISCONTINUED | OUTPATIENT
Start: 2024-02-12 | End: 2024-02-14

## 2024-02-12 RX ORDER — INFLUENZA VIRUS VACCINE 15; 15; 15; 15 UG/.5ML; UG/.5ML; UG/.5ML; UG/.5ML
0.5 SUSPENSION INTRAMUSCULAR ONCE
Refills: 0 | Status: COMPLETED | OUTPATIENT
Start: 2024-02-12 | End: 2024-02-12

## 2024-02-12 RX ORDER — OXYCODONE HYDROCHLORIDE 5 MG/1
5 TABLET ORAL ONCE
Refills: 0 | Status: DISCONTINUED | OUTPATIENT
Start: 2024-02-12 | End: 2024-02-14

## 2024-02-12 RX ORDER — OXYTOCIN 10 UNIT/ML
333.33 VIAL (ML) INJECTION
Qty: 20 | Refills: 0 | Status: DISCONTINUED | OUTPATIENT
Start: 2024-02-12 | End: 2024-02-12

## 2024-02-12 RX ORDER — ACETAMINOPHEN 500 MG
975 TABLET ORAL
Refills: 0 | Status: DISCONTINUED | OUTPATIENT
Start: 2024-02-12 | End: 2024-02-14

## 2024-02-12 RX ORDER — OXYTOCIN 10 UNIT/ML
41.67 VIAL (ML) INJECTION
Qty: 20 | Refills: 0 | Status: DISCONTINUED | OUTPATIENT
Start: 2024-02-12 | End: 2024-02-14

## 2024-02-12 RX ORDER — AER TRAVELER 0.5 G/1
1 SOLUTION RECTAL; TOPICAL EVERY 4 HOURS
Refills: 0 | Status: DISCONTINUED | OUTPATIENT
Start: 2024-02-12 | End: 2024-02-14

## 2024-02-12 RX ORDER — DIPHENHYDRAMINE HCL 50 MG
25 CAPSULE ORAL EVERY 6 HOURS
Refills: 0 | Status: DISCONTINUED | OUTPATIENT
Start: 2024-02-12 | End: 2024-02-14

## 2024-02-12 RX ORDER — OXYCODONE HYDROCHLORIDE 5 MG/1
5 TABLET ORAL
Refills: 0 | Status: DISCONTINUED | OUTPATIENT
Start: 2024-02-12 | End: 2024-02-14

## 2024-02-12 RX ORDER — SODIUM CHLORIDE 9 MG/ML
3 INJECTION INTRAMUSCULAR; INTRAVENOUS; SUBCUTANEOUS EVERY 8 HOURS
Refills: 0 | Status: DISCONTINUED | OUTPATIENT
Start: 2024-02-12 | End: 2024-02-14

## 2024-02-12 RX ORDER — LANOLIN
1 OINTMENT (GRAM) TOPICAL EVERY 6 HOURS
Refills: 0 | Status: DISCONTINUED | OUTPATIENT
Start: 2024-02-12 | End: 2024-02-14

## 2024-02-12 RX ORDER — MAGNESIUM HYDROXIDE 400 MG/1
30 TABLET, CHEWABLE ORAL
Refills: 0 | Status: DISCONTINUED | OUTPATIENT
Start: 2024-02-12 | End: 2024-02-14

## 2024-02-12 RX ORDER — TETANUS TOXOID, REDUCED DIPHTHERIA TOXOID AND ACELLULAR PERTUSSIS VACCINE, ADSORBED 5; 2.5; 8; 8; 2.5 [IU]/.5ML; [IU]/.5ML; UG/.5ML; UG/.5ML; UG/.5ML
0.5 SUSPENSION INTRAMUSCULAR ONCE
Refills: 0 | Status: DISCONTINUED | OUTPATIENT
Start: 2024-02-12 | End: 2024-02-14

## 2024-02-12 RX ORDER — KETOROLAC TROMETHAMINE 30 MG/ML
30 SYRINGE (ML) INJECTION ONCE
Refills: 0 | Status: DISCONTINUED | OUTPATIENT
Start: 2024-02-12 | End: 2024-02-12

## 2024-02-12 RX ORDER — BENZOCAINE 10 %
1 GEL (GRAM) MUCOUS MEMBRANE EVERY 6 HOURS
Refills: 0 | Status: DISCONTINUED | OUTPATIENT
Start: 2024-02-12 | End: 2024-02-14

## 2024-02-12 RX ORDER — IBUPROFEN 200 MG
600 TABLET ORAL EVERY 6 HOURS
Refills: 0 | Status: DISCONTINUED | OUTPATIENT
Start: 2024-02-12 | End: 2024-02-14

## 2024-02-12 RX ORDER — IBUPROFEN 200 MG
600 TABLET ORAL EVERY 6 HOURS
Refills: 0 | Status: COMPLETED | OUTPATIENT
Start: 2024-02-12 | End: 2025-01-10

## 2024-02-12 RX ADMIN — Medication 125 MILLIUNIT(S)/MIN: at 22:51

## 2024-02-12 RX ADMIN — Medication 30 MILLIGRAM(S): at 22:21

## 2024-02-12 RX ADMIN — Medication 2 MILLIUNIT(S)/MIN: at 10:45

## 2024-02-12 RX ADMIN — SODIUM CHLORIDE 125 MILLILITER(S): 9 INJECTION, SOLUTION INTRAVENOUS at 17:20

## 2024-02-12 RX ADMIN — Medication 0.2 MILLIGRAM(S): at 21:30

## 2024-02-12 RX ADMIN — SODIUM CHLORIDE 125 MILLILITER(S): 9 INJECTION, SOLUTION INTRAVENOUS at 09:44

## 2024-02-12 NOTE — PROGRESS NOTE ADULT - SUBJECTIVE AND OBJECTIVE BOX
Labor Progress Note    Patient seen at bedside.   Notes rectal pressure with contractions    T(F): 98.6 (18:43)  HR: 72 (19:47)  BP: 104/51 (19:47)  RR: 20 (18:43)    EFM: baseline 125bpm, moderate variability, +accels, rare variable decels   TOCO: Q2min  SVE: 10/100/0    Labs:                        10.8   7.84  )-----------( 215      ( 2024 09:39 )             32.9           ABO RH Interpretation: O NEG (24 @ 09:45)    Urinalysis Basic - ( 2024 09:39 )    Color: Yellow / Appearance: Cloudy / S.007 / pH: x  Gluc: x / Ketone: Negative mg/dL  / Bili: Negative / Urobili: 0.2 mg/dL   Blood: x / Protein: Negative mg/dL / Nitrite: Negative   Leuk Esterase: Large / RBC: 0 /HPF / WBC 8 /HPF   Sq Epi: x / Non Sq Epi: 3 /HPF / Bacteria: Occasional /HPF          Meds: influenza   Vaccine 0.5 milliLiter(s) IntraMuscular once  lactated ringers. 1000 milliLiter(s) IV Continuous <Continuous>  oxytocin Infusion 333.333 milliUNIT(s)/Min IV Continuous <Continuous>  oxytocin Infusion.  milliUNIT(s)/Min IV Continuous <Continuous> Labor Progress Note    Patient seen at bedside.   Notes rectal pressure with contractions    T(F): 98.6 (18:43)  HR: 72 (19:47)  BP: 104/51 (19:47)  RR: 20 (18:43)    EFM: baseline 125bpm, moderate variability, +accels, rare variable decels (only while patient supine for exam)  TOCO: Q2min  SVE: 10/100/0    EFW 4100g  Placenta: anterior    Labs:                        10.8   7.84  )-----------( 215      ( 2024 09:39 )             32.9           ABO RH Interpretation: O NEG (24 @ 09:45)    Urinalysis Basic - ( 2024 09:39 )    Color: Yellow / Appearance: Cloudy / S.007 / pH: x  Gluc: x / Ketone: Negative mg/dL  / Bili: Negative / Urobili: 0.2 mg/dL   Blood: x / Protein: Negative mg/dL / Nitrite: Negative   Leuk Esterase: Large / RBC: 0 /HPF / WBC 8 /HPF   Sq Epi: x / Non Sq Epi: 3 /HPF / Bacteria: Occasional /HPF      Meds: influenza   Vaccine 0.5 milliLiter(s) IntraMuscular once  lactated ringers. 1000 milliLiter(s) IV Continuous <Continuous>  oxytocin Infusion 333.333 milliUNIT(s)/Min IV Continuous <Continuous>  oxytocin Infusion.  milliUNIT(s)/Min IV Continuous <Continuous>

## 2024-02-12 NOTE — OB RN PATIENT PROFILE - FALL HARM RISK - UNIVERSAL INTERVENTIONS
Bed in lowest position, wheels locked, appropriate side rails in place/Call bell, personal items and telephone in reach/Instruct patient to call for assistance before getting out of bed or chair/Non-slip footwear when patient is out of bed/[place] to call system/Physically safe environment - no spills, clutter or unnecessary equipment/Purposeful Proactive Rounding/Room/bathroom lighting operational, light cord in reach

## 2024-02-12 NOTE — OB PROVIDER H&P - NS ATTEND AMEND GEN_ALL_CORE FT
Cheryl Noriega is a 38y  at 39w2d by LMP c/w 8wk US admitted for term IOL  - Admit SVE 3/80/-3, plan to start pitocin  - Admission labs reviewed H/H 10.8/32.9, plts 215K, O negative (s/p Rhogam )  - FHR category 1, reactive and reassuring  - Last growth US 24 at 36w3d, EFW 3551g (88%), AC >99%, HC 92%, VTX, posterior placenta, ABRAM 23.0cm, BPP 8/8, normal UAD  - GBS neg     Reviewed with patient R/B/A/I of IOL as well as process and expectations. All questions answered to her satisfaction.

## 2024-02-12 NOTE — OB PROVIDER H&P - NSHPLABSRESULTS_GEN_ALL_CORE
LABS  1/25/24  GBS-negative  GC/CT neg/neg    11/10/23  GCT 81  CBC 8.8>11.8/35.5<251    9/20/23  msAFP screen negative    7/31/23  NIPS negative    7/10/23  Blood type O negative  Antibody screen negative  RPR nonreactive  Measles immune  Varicella immune  Rubella immune  HIV nonreactive  HCVab nonreactive  HBsAg nonreactive    US  1/23/24 @ 36w3d: EFW 3551g (88%ile)

## 2024-02-12 NOTE — OB RN PATIENT PROFILE - NS_OBGYNHISTORY_OBGYN_ALL_OB_FT
OB Hx:  x 1 (8 lbs)  22: 39w2d , male, 3650g (8lb), denies AP/IP/PP complications  21: missed AB with D&C in first trimester    Gyn Hx:  Denies cysts, fibroids, abnormal paps, and STIs.

## 2024-02-12 NOTE — OB PROVIDER LABOR PROGRESS NOTE - NS_SUBJECTIVE/OBJECTIVE_OBGYN_ALL_OB_FT
Pt examined for evaluation of labor progress. Resting comfortably in bed; feeling contractions but coping well. Pitocin IOL in progress, currently infusing at 6 mu/min.

## 2024-02-12 NOTE — OB PROVIDER DELIVERY SUMMARY - NSPROVIDERDELIVERYNOTE_OBGYN_ALL_OB_FT
Patient was fully dilated and pushing. Fetal head was OA and restituted to LOT. Nuchal cord x2, delivered through. The anterior and posterior shoulders delivered, followed by the remaining body atraumatically. Delayed cord clamping was performed, and then clamped and cut. Cord blood gases collected x2. The  was handed to the mother and RN. The placenta delivered intact with membranes. Pitocin was administered. Uterus massaged, fundus found to be firm. Intermittent PHILIPPE atony noted with clots removed from PHILIPPE, Resolved with IM methergine. Cervix, vagina and perineum inspected - 2nd degree laceration was noted, repaired using 2-0 Vicryl in the usual fashion with good hemostasis.     Viable male/female infant delivered, weighing 3660g (8lb 1oz), with APGARs 9/9    QBL 135mL

## 2024-02-12 NOTE — OB PROVIDER LABOR PROGRESS NOTE - ASSESSMENT
38 y.o.  @ 39w2d weeks, Rh-negative (s/p RhoGam 23), GBS-negative, for induction of labor at term.   -continue Pitocin IOL per unit protocol  -continue EFM and toco monitoring  Dr. Carter and Dr. Alexandre aware

## 2024-02-12 NOTE — OB PROVIDER H&P - HISTORY OF PRESENT ILLNESS
38 y.o.  @ 39w2d by LMP (c/w early US), ANTHONY 24, presents for scheduled induction of labor at term. Denies VB, LOF, and ctx. +FM. GBS-negative. Rh-negative (received RhoGam 23). Denies complications with this pregnancy.

## 2024-02-12 NOTE — OB PROVIDER H&P - NSHPPHYSICALEXAM_GEN_ALL_CORE
T(C): 37 (02-12-24 @ 09:06), Max: 37.0 (02-12-24 @ 08:52)  HR: 80 (02-12-24 @ 09:06) (80 - 80)  BP: 127/61 (02-12-24 @ 09:06) (127/61 - 127/61)  RR: 20 (02-12-24 @ 09:06) (20 - 20)    General: alert, oriented, no acute distress  Abd: soft, gravid, nontender  Extremities: no sign of DVT on exam    EFM: bpm, moderate variability, +accels (Cat 1)  TOCO: irregular    SVE: 3/80/-3, vertex, intact

## 2024-02-12 NOTE — OB PROVIDER H&P - NSLOWPPHRISK_OBGYN_A_OB
No previous uterine incision/[facility] Pregnancy/Less than or equal to 4 previous vaginal births/No known bleeding disorder/No history of postpartum hemorrhage

## 2024-02-12 NOTE — PROCEDURE NOTE - ADDITIONAL PROCEDURE DETAILS
Thorough discussion of patient's history, as indicated above.  Discussed risks of spinal/epidural, including PDPH, inadequate analgesia occasionally requiring epidural catheter replacement/ general anesthesia, bleeding, infection and spinal cord injury. hypotension and nausea. Patient expressed understanding of these risks, signed informed consent and wishes to proceed with spinal/epidural    Patient sitting. Lumbar epidural performed at L3-4. Standard ASA monitors including FHR. Sterile gloves, Chloro-prep. 1% lidocaine for local infiltration. 17g Touhy. MILENA with air at 7 cm. 27g Korin used to make a dural puncture with + CSF. Korin needle removed and epidural catheter threaded easily. Touhy needle removed. Catheter secured in place at 13  cm. Negative aspiration. Test dose consisting of 3ml 1.5% lidocaine with epinephrine 1:200k was negative. 2cc 1.5% lidocaine with epinephrine 1:200k. 10cc .125% Bupivacaine in two divided doses of 5cc. Patient tolerated procedure and was hemodynamically stable throughout. T10 level bilaterally.     Post Procedure/ Top OFF Patient evaluated at bedside.  Hemodynamically stable, degree of motor block appropriate for epidural medication administered. Pain is well controlled bilaterally. Patient is aware that the anesthesia team is available 24/7, in case she needs more pain medication or has any other anesthesia-related needs or questions.     .0625% Bupivacaine +2ucg/cc Fentanyl epidural PIEB Intermittent Bolus 9ml, Bolous interval 45 min, Next bolus 30 min. PCEA 8ml, PCEA Lockout 10 min, 1 hour limit 37ml

## 2024-02-12 NOTE — OB PROVIDER H&P - ASSESSMENT
38y.o.  @ 39w2d weeks, Rh-negative (s/p RhoGam 23), GBS-negative, for induction of labor at term.     Admit to L&D  Admission labs  IV hydration  Continuous EFM & TOCO monitoring  Clear Liquid diet  Pain Management PRN  IOL w/Pitocin per unit protocol    Dr. Carter and Dr. Alexandre aware 38y.o.  @ 39w2d weeks, Rh-negative (s/p RhoGam 23), GBS-negative, for induction of labor at term.     Admit to L&D  Admission labs  Cross match 2 units PRBC  IV hydration  Continuous EFM & TOCO monitoring  Clear Liquid diet  Pain Management PRN  IOL w/Pitocin per unit protocol    Dr. Carter and Dr. Alexandre aware

## 2024-02-13 LAB
BASOPHILS # BLD AUTO: 0.06 K/UL — SIGNIFICANT CHANGE UP (ref 0–0.2)
BASOPHILS NFR BLD AUTO: 0.5 % — SIGNIFICANT CHANGE UP (ref 0–1)
EOSINOPHIL # BLD AUTO: 0.04 K/UL — SIGNIFICANT CHANGE UP (ref 0–0.7)
EOSINOPHIL NFR BLD AUTO: 0.3 % — SIGNIFICANT CHANGE UP (ref 0–8)
HCT VFR BLD CALC: 37 % — SIGNIFICANT CHANGE UP (ref 37–47)
HGB BLD-MCNC: 11.9 G/DL — LOW (ref 12–16)
HIV 1 & 2 AB SERPL IA.RAPID: SIGNIFICANT CHANGE UP
IMM GRANULOCYTES NFR BLD AUTO: 1.1 % — HIGH (ref 0.1–0.3)
LYMPHOCYTES # BLD AUTO: 1.19 K/UL — LOW (ref 1.2–3.4)
LYMPHOCYTES # BLD AUTO: 9.7 % — LOW (ref 20.5–51.1)
MCHC RBC-ENTMCNC: 28.1 PG — SIGNIFICANT CHANGE UP (ref 27–31)
MCHC RBC-ENTMCNC: 32.2 G/DL — SIGNIFICANT CHANGE UP (ref 32–37)
MCV RBC AUTO: 87.3 FL — SIGNIFICANT CHANGE UP (ref 81–99)
MONOCYTES # BLD AUTO: 0.59 K/UL — SIGNIFICANT CHANGE UP (ref 0.1–0.6)
MONOCYTES NFR BLD AUTO: 4.8 % — SIGNIFICANT CHANGE UP (ref 1.7–9.3)
NEUTROPHILS # BLD AUTO: 10.23 K/UL — HIGH (ref 1.4–6.5)
NEUTROPHILS NFR BLD AUTO: 83.6 % — HIGH (ref 42.2–75.2)
NRBC # BLD: 0 /100 WBCS — SIGNIFICANT CHANGE UP (ref 0–0)
PLATELET # BLD AUTO: 195 K/UL — SIGNIFICANT CHANGE UP (ref 130–400)
PMV BLD: 11 FL — HIGH (ref 7.4–10.4)
RBC # BLD: 4.24 M/UL — SIGNIFICANT CHANGE UP (ref 4.2–5.4)
RBC # FLD: 13.9 % — SIGNIFICANT CHANGE UP (ref 11.5–14.5)
WBC # BLD: 12.25 K/UL — HIGH (ref 4.8–10.8)
WBC # FLD AUTO: 12.25 K/UL — HIGH (ref 4.8–10.8)

## 2024-02-13 RX ADMIN — Medication 975 MILLIGRAM(S): at 21:41

## 2024-02-13 RX ADMIN — Medication 600 MILLIGRAM(S): at 23:25

## 2024-02-13 RX ADMIN — SODIUM CHLORIDE 3 MILLILITER(S): 9 INJECTION INTRAMUSCULAR; INTRAVENOUS; SUBCUTANEOUS at 06:51

## 2024-02-13 RX ADMIN — Medication 600 MILLIGRAM(S): at 12:40

## 2024-02-13 RX ADMIN — Medication 600 MILLIGRAM(S): at 18:11

## 2024-02-13 RX ADMIN — Medication 975 MILLIGRAM(S): at 03:20

## 2024-02-13 RX ADMIN — Medication 600 MILLIGRAM(S): at 07:10

## 2024-02-13 RX ADMIN — Medication 600 MILLIGRAM(S): at 19:00

## 2024-02-13 RX ADMIN — Medication 975 MILLIGRAM(S): at 15:27

## 2024-02-13 RX ADMIN — SODIUM CHLORIDE 3 MILLILITER(S): 9 INJECTION INTRAMUSCULAR; INTRAVENOUS; SUBCUTANEOUS at 21:41

## 2024-02-13 RX ADMIN — Medication 1 TABLET(S): at 12:54

## 2024-02-13 RX ADMIN — Medication 975 MILLIGRAM(S): at 20:58

## 2024-02-13 RX ADMIN — SODIUM CHLORIDE 3 MILLILITER(S): 9 INJECTION INTRAMUSCULAR; INTRAVENOUS; SUBCUTANEOUS at 15:39

## 2024-02-13 RX ADMIN — Medication 975 MILLIGRAM(S): at 02:30

## 2024-02-13 RX ADMIN — Medication 975 MILLIGRAM(S): at 11:06

## 2024-02-13 RX ADMIN — Medication 600 MILLIGRAM(S): at 06:02

## 2024-02-13 RX ADMIN — Medication 1 SPRAY(S): at 21:00

## 2024-02-13 RX ADMIN — Medication 975 MILLIGRAM(S): at 10:18

## 2024-02-13 RX ADMIN — Medication 600 MILLIGRAM(S): at 12:54

## 2024-02-13 RX ADMIN — Medication 1 APPLICATION(S): at 21:32

## 2024-02-13 RX ADMIN — Medication 975 MILLIGRAM(S): at 16:40

## 2024-02-14 VITALS
DIASTOLIC BLOOD PRESSURE: 69 MMHG | RESPIRATION RATE: 18 BRPM | HEART RATE: 67 BPM | TEMPERATURE: 98 F | SYSTOLIC BLOOD PRESSURE: 122 MMHG

## 2024-02-14 RX ORDER — BENZOCAINE 10 %
1 GEL (GRAM) MUCOUS MEMBRANE
Qty: 0 | Refills: 0 | DISCHARGE
Start: 2024-02-14

## 2024-02-14 RX ORDER — MAGNESIUM HYDROXIDE 400 MG/1
30 TABLET, CHEWABLE ORAL
Qty: 0 | Refills: 0 | DISCHARGE
Start: 2024-02-14

## 2024-02-14 RX ORDER — ACETAMINOPHEN 500 MG
3 TABLET ORAL
Qty: 0 | Refills: 0 | DISCHARGE
Start: 2024-02-14

## 2024-02-14 RX ORDER — IBUPROFEN 200 MG
1 TABLET ORAL
Qty: 0 | Refills: 0 | DISCHARGE
Start: 2024-02-14

## 2024-02-14 RX ORDER — SIMETHICONE 80 MG/1
1 TABLET, CHEWABLE ORAL
Qty: 0 | Refills: 0 | DISCHARGE
Start: 2024-02-14

## 2024-02-14 RX ORDER — AER TRAVELER 0.5 G/1
1 SOLUTION RECTAL; TOPICAL
Qty: 0 | Refills: 0 | DISCHARGE
Start: 2024-02-14

## 2024-02-14 RX ADMIN — Medication 600 MILLIGRAM(S): at 07:10

## 2024-02-14 RX ADMIN — Medication 975 MILLIGRAM(S): at 08:41

## 2024-02-14 RX ADMIN — Medication 600 MILLIGRAM(S): at 00:00

## 2024-02-14 RX ADMIN — Medication 600 MILLIGRAM(S): at 06:08

## 2024-02-14 RX ADMIN — Medication 600 MILLIGRAM(S): at 12:19

## 2024-02-14 RX ADMIN — Medication 975 MILLIGRAM(S): at 09:17

## 2024-02-14 RX ADMIN — SODIUM CHLORIDE 3 MILLILITER(S): 9 INJECTION INTRAMUSCULAR; INTRAVENOUS; SUBCUTANEOUS at 06:05

## 2024-02-14 RX ADMIN — Medication 1 TABLET(S): at 12:19

## 2024-02-14 NOTE — DISCHARGE NOTE OB - MATERIALS PROVIDED
Vaccinations/[facility]  Screening Program/  Immunization Record/Breastfeeding Log/Breastfeeding Mother’s Support Group Information/Guide to Postpartum Care/Shaken Baby Prevention Handout/Breastfeeding Guide and Packet/Birth Certificate Instructions

## 2024-02-14 NOTE — DISCHARGE NOTE OB - PAIN PRESENT
Recent Labs     01/15/24  0051 01/15/24  0604 01/16/24  0403   K 4.7 3.7 3.2*         Plan:  Replenished with oral 40 mEq.   No

## 2024-02-14 NOTE — PROGRESS NOTE ADULT - ASSESSMENT
PPD#2 s/p  doing well    - d/c home today 
38y  at 39w2d by LMP c/w 8wk US admitted for term IOL, now in second stage.     AROM (clear) at 1740  Fully dilated at (time):   Pitocin at: 6mU/min  FHT: Category 2 for rare variable decels, reactive and reassuring  GBS negative    Plan:   Continue to monitor maternal fetal status  Cont toco/EFM  Second stage instructions reviewed with patient  Continue current management  Discussed R/B/A/I of starting pushing or laboring down - patient would like to wait until she feels more urge to push.  Re-evaluate hourly  Induction Method: {induction method :38764}  Pitocin: {j labor plan pit:16144}  Uterine resuscitative measures: {[facility] labor uterine measures:36365}  Internal devices: {[facility] labor internal devices:36918}  Other interventions: {[facility] labor interventions other:47809}  Anticipate vaginal birth

## 2024-02-14 NOTE — DISCHARGE NOTE OB - PATIENT PORTAL LINK FT
You can access the FollowMyHealth Patient Portal offered by [facility] by registering at the following website: [email or web address]. By joining [employer] [facility]’s FollowMyHealth portal, you will also be able to view your health information using other applications (apps) compatible with our system.

## 2024-02-14 NOTE — DISCHARGE NOTE OB - CARE PROVIDER_API CALL
Nella Ortiz  Obstetrics and Gynecology  [address] [place] [id number]  Phone: [phone]  Fax: [phone]  Follow Up Time:

## 2024-02-14 NOTE — DISCHARGE NOTE OB - MEDICATION SUMMARY - MEDICATIONS TO TAKE
I will START or STAY ON the medications listed below when I get home from the hospital:    ibuprofen 600 mg oral tablet  -- 1 tab(s) by mouth every 6 hours  -- Indication: For pain    acetaminophen 325 mg oral tablet  -- 3 tab(s) by mouth every 6 hours  -- Indication: For pain    magnesium hydroxide 8% oral suspension  -- 30 milliliter(s) by mouth 2 times a day As needed Constipation  -- Indication: For constipation    benzocaine 20% topical spray  -- 1 Apply on skin to affected area every 6 hours As needed for Perineal discomfort  -- Indication: For perineal pain    witch hazel 50% topical pad  -- 1 Apply on skin to affected area every 4 hours As needed Perineal discomfort  -- Indication: For perineal pain    Prenatal Multivitamins with Folic Acid 1 mg oral tablet  -- 1 tab(s) by mouth once a day  -- Indication: For vitamins    simethicone 80 mg oral tablet, chewable  -- 1 tab(s) by mouth every 4 hours As needed Gas  -- Indication: For gas pain

## 2024-02-23 DIAGNOSIS — Z71.85 ENCOUNTER FOR IMMUNIZATION SAFETY COUNSELING: ICD-10-CM

## 2024-02-23 DIAGNOSIS — Z28.09 IMMUNIZATION NOT CARRIED OUT BECAUSE OF OTHER CONTRAINDICATION: ICD-10-CM

## 2024-02-23 DIAGNOSIS — Z3A.39 39 WEEKS GESTATION OF PREGNANCY: ICD-10-CM

## 2024-02-23 DIAGNOSIS — O40.3XX0 POLYHYDRAMNIOS, THIRD TRIMESTER, NOT APPLICABLE OR UNSPECIFIED: ICD-10-CM

## 2024-02-23 DIAGNOSIS — O26.893 OTHER SPECIFIED PREGNANCY RELATED CONDITIONS, THIRD TRIMESTER: ICD-10-CM

## 2024-02-23 DIAGNOSIS — Z67.41 TYPE O BLOOD, RH NEGATIVE: ICD-10-CM

## 2024-02-23 DIAGNOSIS — Z28.21 IMMUNIZATION NOT CARRIED OUT BECAUSE OF PATIENT REFUSAL: ICD-10-CM

## 2024-12-08 NOTE — OB PROVIDER H&P - NSHPREVIEWOFSYSTEMS_GEN_ALL_CORE
Denies the following: constitutional symptoms, visual symptoms, cardiovascular symptoms, respiratory symptoms, GI symptoms, musculoskeletal symptoms, skin symptoms, neurologic symptoms, hematologic symptoms, allergic symptoms, psychiatric symptoms  Except any pertinent positives listed.
08-Dec-2024 23:08

## 2025-06-05 NOTE — OB PROVIDER H&P - NSPREVIOUSTERM_OBGYN_ALL_OB
Lab Results   Component Value Date    [id number] 3.180 01/08/2025    TSH 6.27 (H) 05/02/2024    Continue levothyroxine 50 mcg daily   No